# Patient Record
Sex: MALE | Race: WHITE | NOT HISPANIC OR LATINO | URBAN - METROPOLITAN AREA
[De-identification: names, ages, dates, MRNs, and addresses within clinical notes are randomized per-mention and may not be internally consistent; named-entity substitution may affect disease eponyms.]

---

## 2020-02-24 ENCOUNTER — EMERGENCY (EMERGENCY)
Facility: HOSPITAL | Age: 35
LOS: 0 days | Discharge: HOME | End: 2020-02-25
Admitting: EMERGENCY MEDICINE
Payer: COMMERCIAL

## 2020-02-24 VITALS
SYSTOLIC BLOOD PRESSURE: 119 MMHG | RESPIRATION RATE: 17 BRPM | HEART RATE: 87 BPM | OXYGEN SATURATION: 97 % | DIASTOLIC BLOOD PRESSURE: 68 MMHG | WEIGHT: 149.91 LBS | HEIGHT: 68 IN | TEMPERATURE: 98 F
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
## 2020-02-24 DIAGNOSIS — S99.922A UNSPECIFIED INJURY OF LEFT FOOT, INITIAL ENCOUNTER: ICD-10-CM

## 2020-02-24 DIAGNOSIS — Z23 ENCOUNTER FOR IMMUNIZATION: ICD-10-CM
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
## 2020-02-24 DIAGNOSIS — M79.676 PAIN IN UNSPECIFIED TOE(S): ICD-10-CM
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
## 2020-02-24 PROCEDURE — 99283 EMERGENCY DEPT VISIT LOW MDM: CPT

## 2020-02-24 NOTE — ED PROVIDER NOTE - PATIENT PORTAL LINK FT
You can access the FollowMyHealth Patient Portal offered by [facility] by registering at the following website: [email or web address]. By joining [facility]’s FollowMyHealth portal, you will also be able to view your health information using other applications (apps) compatible with our system.

## 2020-02-24 NOTE — ED ADULT TRIAGE NOTE - HEIGHT IN CM
172.72 Is This A New Presentation, Or A Follow-Up?: Skin Lesions How Severe Is Your Skin Lesion?: mild Have Your Skin Lesions Been Treated?: not been treated

## 2020-02-24 NOTE — ED PROVIDER NOTE - CLINICAL SUMMARY MEDICAL DECISION MAKING FREE TEXT BOX
34 year old healthy male here with an injury to his left 5th toe after dropping a can of tuna on it earlier tonight. Pt well appearing, ambulating w/o difficulty, vitals wnl. xrays negative for fx. dried blood cleaned, toe stone taped. patient to f/u with pmd, verbalizes understanding of return precautions.

## 2020-02-24 NOTE — ED PROVIDER NOTE - NS ED ROS FT
MSK: see HPI, (-) gait difficulty  SKIN: see HPI, (-) pallor, (-) ecchymosis  NEURO: (-) numbness, (-) weakness, (-) paresthesias    *all other systems negative except as documented above and in the HPI*

## 2020-02-24 NOTE — ED PROVIDER NOTE - NSFOLLOWUPINSTRUCTIONS_ED_ALL_ED_FT
Nail Avulsion    WHAT YOU NEED TO KNOW:    Nail avulsion is when part or all of a nail is torn away or removed from the nail bed. Avulsion may happen on your finger or toe. Common causes include ingrown nail, injury, or infection. The nail bed will form a hard layer and then a new nail may grow. The nail bed will be sensitive until the hard layer forms. You will need to keep it covered to prevent infection or more injury. You may need to care for your nail area for several months as the new nail grows.    DISCHARGE INSTRUCTIONS:    Return to the emergency department if:     Blood soaks through your bandage.      You have a red streak running up your leg or arm.    Contact your healthcare provider if:     You have a fever or chills.      Your injured area is red, swollen, or draining pus.       You have new or worsening pain, or pain that does not get better with medicine.      You have questions or concerns about your condition or care.    Medicines:     Antibiotics may help treat or prevent a bacterial infection.      Acetaminophen decreases pain and fever. It is available without a doctor's order. Ask how much to take and how often to take it. Follow directions. Acetaminophen can cause liver damage if not taken correctly.      NSAIDs, such as ibuprofen, help decrease swelling, pain, and fever. This medicine is available with or without a doctor's order. NSAIDs can cause stomach bleeding or kidney problems in certain people. If you take blood thinner medicine, always ask your healthcare provider if NSAIDs are safe for you. Always read the medicine label and follow directions.      Take your medicine as directed. Contact your healthcare provider if you think your medicine is not helping or if you have side effects. Tell him or her if you are allergic to any medicine. Keep a list of the medicines, vitamins, and herbs you take. Include the amounts, and when and why you take them. Bring the list or the pill bottles to follow-up visits. Carry your medicine list with you in case of an emergency.    Self-care:     Keep your nail area clean, dry, and covered. When you are allowed to bathe, carefully wash the area with soap and water. Put on a clean, new bandage. Do not use an adhesive bandage. It may stick to the wound and cause pain when you remove it. Ask your healthcare provider what kind of bandage to use. Change your bandage when it gets wet or dirty. Your healthcare provider may suggest that you change the bandage every 24 hours for the first few days.      Elevate your hand or foot above the level of your heart as often as you can for 24 hours. This will help decrease swelling and pain. Prop your hand or foot on pillows or blankets to keep it elevated comfortably.       Apply ice on your wound area for 15 to 20 minutes every hour or as directed. Use an ice pack, or put crushed ice in a plastic bag. Cover it with a towel. Ice helps prevent tissue damage and decreases swelling and pain.      Do not wear tight shoes or shoes that do not fit well. Do not wear tights or pantyhose. Wear cotton socks.      Ask when you can return to work, school, or your usual sports and activities.    Follow up with your healthcare provider as directed: You may be referred to a hand or foot specialist. Write down your questions so you remember to ask them during your visits.        © Copyright [employer] 2019 All illustrations and images included in CareNotes are the copyrighted property of [employer] or [employer].

## 2020-02-24 NOTE — ED PROVIDER NOTE - OBJECTIVE STATEMENT
34 year old male w no significant pmhx presents to the ED for evaluation of constant, mild, non-radiating pain to his left 5th toe x 1 hour. Pt was at home, wearing only socks when he accidentally dropped a can of tuna on his toe. States the nail appears to be close to falling off. Bleeding controlled prior to arrival. Pt otherwise states he is in his normal state of health.

## 2020-02-24 NOTE — ED PROVIDER NOTE - PHYSICAL EXAMINATION
VITALS:  I have reviewed the initial vital signs.  GENERAL: Well-developed, well-nourished, in no acute distress. Nontoxic.  CARDIO: 2+ pedal pulses bilaterally.  MSK: Normal, steady gait. FROM to b/l toes and ankles. No joint swelling, ttp, or deformity.   SKIN: Warm, dry. Left 5th toenail with scant amount of dried blood around cuticle. No subungual hematoma. No laceration or nail avulsion. Capillary refill <2 seconds.  NEURO: A&Ox3. Speech clear. 5/5 strength to lower extremities b/l. Sensation intact and equal throughout.

## 2020-02-25 PROCEDURE — 73630 X-RAY EXAM OF FOOT: CPT | Mod: 26,LT

## 2020-02-25 RX ORDER — TETANUS TOXOID, REDUCED DIPHTHERIA TOXOID AND ACELLULAR PERTUSSIS VACCINE, ADSORBED 5; 2.5; 8; 8; 2.5 [IU]/.5ML; [IU]/.5ML; UG/.5ML; UG/.5ML; UG/.5ML
0.5 SUSPENSION INTRAMUSCULAR ONCE
Refills: 0 | Status: COMPLETED | OUTPATIENT
Start: 2020-02-25 | End: 2020-02-25

## 2020-02-25 RX ADMIN — TETANUS TOXOID, REDUCED DIPHTHERIA TOXOID AND ACELLULAR PERTUSSIS VACCINE, ADSORBED 0.5 MILLILITER(S): 5; 2.5; 8; 8; 2.5 SUSPENSION INTRAMUSCULAR at 00:30

## 2020-02-25 NOTE — ED PROCEDURE NOTE - NS ED ATTENDING NAME FT
How Severe Are Your Spot(S)?: mild Have Your Spot(S) Been Treated In The Past?: has not been treated Hpi Title: Evaluation of Skin Lesions Year Removed: 1900 arley

## 2021-07-15 ENCOUNTER — RESULT REVIEW (OUTPATIENT)
Age: 36
End: 2021-07-15

## 2021-07-15 ENCOUNTER — TRANSCRIPTION ENCOUNTER (OUTPATIENT)
Age: 36
End: 2021-07-15

## 2021-07-15 ENCOUNTER — INPATIENT (INPATIENT)
Facility: HOSPITAL | Age: 36
LOS: 0 days | Discharge: HOME | End: 2021-07-16
Attending: SURGERY | Admitting: SURGERY
Payer: COMMERCIAL

## 2021-07-15 VITALS
OXYGEN SATURATION: 98 % | TEMPERATURE: 98 F | SYSTOLIC BLOOD PRESSURE: 116 MMHG | RESPIRATION RATE: 18 BRPM | WEIGHT: 160.06 LBS | DIASTOLIC BLOOD PRESSURE: 62 MMHG | HEIGHT: 68 IN | HEART RATE: 66 BPM

## 2021-07-15 LAB
ALBUMIN SERPL ELPH-MCNC: 4.7 G/DL — SIGNIFICANT CHANGE UP (ref 3.5–5.2)
ALP SERPL-CCNC: 60 U/L — SIGNIFICANT CHANGE UP (ref 30–115)
ALT FLD-CCNC: 13 U/L — SIGNIFICANT CHANGE UP (ref 0–41)
ANION GAP SERPL CALC-SCNC: 12 MMOL/L — SIGNIFICANT CHANGE UP (ref 7–14)
APPEARANCE UR: CLEAR — SIGNIFICANT CHANGE UP
AST SERPL-CCNC: 19 U/L — SIGNIFICANT CHANGE UP (ref 0–41)
BASOPHILS # BLD AUTO: 0 K/UL — SIGNIFICANT CHANGE UP (ref 0–0.2)
BASOPHILS NFR BLD AUTO: 0 % — SIGNIFICANT CHANGE UP (ref 0–1)
BILIRUB SERPL-MCNC: 0.8 MG/DL — SIGNIFICANT CHANGE UP (ref 0.2–1.2)
BILIRUB UR-MCNC: NEGATIVE — SIGNIFICANT CHANGE UP
BLD GP AB SCN SERPL QL: SIGNIFICANT CHANGE UP
BUN SERPL-MCNC: 12 MG/DL — SIGNIFICANT CHANGE UP (ref 10–20)
CALCIUM SERPL-MCNC: 9.4 MG/DL — SIGNIFICANT CHANGE UP (ref 8.5–10.1)
CHLORIDE SERPL-SCNC: 98 MMOL/L — SIGNIFICANT CHANGE UP (ref 98–110)
CO2 SERPL-SCNC: 24 MMOL/L — SIGNIFICANT CHANGE UP (ref 17–32)
COLOR SPEC: COLORLESS — SIGNIFICANT CHANGE UP
CREAT SERPL-MCNC: 0.8 MG/DL — SIGNIFICANT CHANGE UP (ref 0.7–1.5)
DIFF PNL FLD: SIGNIFICANT CHANGE UP
ELLIPTOCYTES BLD QL SMEAR: SLIGHT — SIGNIFICANT CHANGE UP
EOSINOPHIL # BLD AUTO: 0 K/UL — SIGNIFICANT CHANGE UP (ref 0–0.7)
EOSINOPHIL NFR BLD AUTO: 0 % — SIGNIFICANT CHANGE UP (ref 0–8)
GIANT PLATELETS BLD QL SMEAR: PRESENT — SIGNIFICANT CHANGE UP
GLUCOSE SERPL-MCNC: 102 MG/DL — HIGH (ref 70–99)
GLUCOSE UR QL: NEGATIVE — SIGNIFICANT CHANGE UP
HCT VFR BLD CALC: 43 % — SIGNIFICANT CHANGE UP (ref 42–52)
HGB BLD-MCNC: 14.7 G/DL — SIGNIFICANT CHANGE UP (ref 14–18)
KETONES UR-MCNC: ABNORMAL
LEUKOCYTE ESTERASE UR-ACNC: NEGATIVE — SIGNIFICANT CHANGE UP
LYMPHOCYTES # BLD AUTO: 0.44 K/UL — LOW (ref 1.2–3.4)
LYMPHOCYTES # BLD AUTO: 2.6 % — LOW (ref 20.5–51.1)
MANUAL SMEAR VERIFICATION: SIGNIFICANT CHANGE UP
MCHC RBC-ENTMCNC: 30.3 PG — SIGNIFICANT CHANGE UP (ref 27–31)
MCHC RBC-ENTMCNC: 34.2 G/DL — SIGNIFICANT CHANGE UP (ref 32–37)
MCV RBC AUTO: 88.7 FL — SIGNIFICANT CHANGE UP (ref 80–94)
MONOCYTES # BLD AUTO: 1.62 K/UL — HIGH (ref 0.1–0.6)
MONOCYTES NFR BLD AUTO: 9.5 % — HIGH (ref 1.7–9.3)
NEUTROPHILS # BLD AUTO: 14.81 K/UL — HIGH (ref 1.4–6.5)
NEUTROPHILS NFR BLD AUTO: 86.1 % — HIGH (ref 42.2–75.2)
NEUTS BAND # BLD: 0.9 % — SIGNIFICANT CHANGE UP (ref 0–6)
NITRITE UR-MCNC: NEGATIVE — SIGNIFICANT CHANGE UP
PH UR: 7.5 — SIGNIFICANT CHANGE UP (ref 5–8)
PLAT MORPH BLD: NORMAL — SIGNIFICANT CHANGE UP
PLATELET # BLD AUTO: 227 K/UL — SIGNIFICANT CHANGE UP (ref 130–400)
POTASSIUM SERPL-MCNC: 4.2 MMOL/L — SIGNIFICANT CHANGE UP (ref 3.5–5)
POTASSIUM SERPL-SCNC: 4.2 MMOL/L — SIGNIFICANT CHANGE UP (ref 3.5–5)
PROT SERPL-MCNC: 6.9 G/DL — SIGNIFICANT CHANGE UP (ref 6–8)
PROT UR-MCNC: NEGATIVE — SIGNIFICANT CHANGE UP
RBC # BLD: 4.85 M/UL — SIGNIFICANT CHANGE UP (ref 4.7–6.1)
RBC # FLD: 11.8 % — SIGNIFICANT CHANGE UP (ref 11.5–14.5)
RBC BLD AUTO: NORMAL — SIGNIFICANT CHANGE UP
SARS-COV-2 RNA SPEC QL NAA+PROBE: SIGNIFICANT CHANGE UP
SODIUM SERPL-SCNC: 134 MMOL/L — LOW (ref 135–146)
SP GR SPEC: 1.01 — LOW (ref 1.01–1.03)
STOMATOCYTES BLD QL SMEAR: SLIGHT — SIGNIFICANT CHANGE UP
UROBILINOGEN FLD QL: SIGNIFICANT CHANGE UP
VARIANT LYMPHS # BLD: 0.9 % — SIGNIFICANT CHANGE UP (ref 0–5)
WBC # BLD: 17.02 K/UL — HIGH (ref 4.8–10.8)
WBC # FLD AUTO: 17.02 K/UL — HIGH (ref 4.8–10.8)

## 2021-07-15 PROCEDURE — 74177 CT ABD & PELVIS W/CONTRAST: CPT | Mod: 26,MA

## 2021-07-15 PROCEDURE — 93010 ELECTROCARDIOGRAM REPORT: CPT

## 2021-07-15 PROCEDURE — 88112 CYTOPATH CELL ENHANCE TECH: CPT | Mod: 26

## 2021-07-15 PROCEDURE — 44970 LAPAROSCOPY APPENDECTOMY: CPT

## 2021-07-15 PROCEDURE — 71045 X-RAY EXAM CHEST 1 VIEW: CPT | Mod: 26

## 2021-07-15 PROCEDURE — 88304 TISSUE EXAM BY PATHOLOGIST: CPT | Mod: 26

## 2021-07-15 PROCEDURE — 88305 TISSUE EXAM BY PATHOLOGIST: CPT | Mod: 26

## 2021-07-15 PROCEDURE — 99284 EMERGENCY DEPT VISIT MOD MDM: CPT | Mod: 57

## 2021-07-15 PROCEDURE — 99285 EMERGENCY DEPT VISIT HI MDM: CPT

## 2021-07-15 RX ORDER — ONDANSETRON 8 MG/1
4 TABLET, FILM COATED ORAL ONCE
Refills: 0 | Status: DISCONTINUED | OUTPATIENT
Start: 2021-07-15 | End: 2021-07-16

## 2021-07-15 RX ORDER — BUPIVACAINE 13.3 MG/ML
20 INJECTION, SUSPENSION, LIPOSOMAL INFILTRATION ONCE
Refills: 0 | Status: DISCONTINUED | OUTPATIENT
Start: 2021-07-15 | End: 2021-07-15

## 2021-07-15 RX ORDER — HYDROMORPHONE HYDROCHLORIDE 2 MG/ML
0.5 INJECTION INTRAMUSCULAR; INTRAVENOUS; SUBCUTANEOUS
Refills: 0 | Status: DISCONTINUED | OUTPATIENT
Start: 2021-07-15 | End: 2021-07-16

## 2021-07-15 RX ORDER — MEPERIDINE HYDROCHLORIDE 50 MG/ML
12.5 INJECTION INTRAMUSCULAR; INTRAVENOUS; SUBCUTANEOUS
Refills: 0 | Status: DISCONTINUED | OUTPATIENT
Start: 2021-07-15 | End: 2021-07-16

## 2021-07-15 RX ORDER — ACETAMINOPHEN 500 MG
650 TABLET ORAL EVERY 6 HOURS
Refills: 0 | Status: DISCONTINUED | OUTPATIENT
Start: 2021-07-15 | End: 2021-07-15

## 2021-07-15 RX ORDER — MORPHINE SULFATE 50 MG/1
2 CAPSULE, EXTENDED RELEASE ORAL EVERY 6 HOURS
Refills: 0 | Status: DISCONTINUED | OUTPATIENT
Start: 2021-07-15 | End: 2021-07-15

## 2021-07-15 RX ORDER — PANTOPRAZOLE SODIUM 20 MG/1
40 TABLET, DELAYED RELEASE ORAL
Refills: 0 | Status: DISCONTINUED | OUTPATIENT
Start: 2021-07-15 | End: 2021-07-15

## 2021-07-15 RX ORDER — KETOROLAC TROMETHAMINE 30 MG/ML
15 SYRINGE (ML) INJECTION EVERY 6 HOURS
Refills: 0 | Status: DISCONTINUED | OUTPATIENT
Start: 2021-07-15 | End: 2021-07-15

## 2021-07-15 RX ORDER — IBUPROFEN 200 MG
1 TABLET ORAL
Qty: 28 | Refills: 0
Start: 2021-07-15 | End: 2021-07-21

## 2021-07-15 RX ORDER — AMPICILLIN SODIUM AND SULBACTAM SODIUM 250; 125 MG/ML; MG/ML
3 INJECTION, POWDER, FOR SUSPENSION INTRAMUSCULAR; INTRAVENOUS ONCE
Refills: 0 | Status: COMPLETED | OUTPATIENT
Start: 2021-07-15 | End: 2021-07-15

## 2021-07-15 RX ORDER — OXYCODONE HYDROCHLORIDE 5 MG/1
5 TABLET ORAL EVERY 6 HOURS
Refills: 0 | Status: DISCONTINUED | OUTPATIENT
Start: 2021-07-15 | End: 2021-07-16

## 2021-07-15 RX ORDER — HYDROMORPHONE HYDROCHLORIDE 2 MG/ML
1 INJECTION INTRAMUSCULAR; INTRAVENOUS; SUBCUTANEOUS
Refills: 0 | Status: DISCONTINUED | OUTPATIENT
Start: 2021-07-15 | End: 2021-07-16

## 2021-07-15 RX ORDER — ENOXAPARIN SODIUM 100 MG/ML
40 INJECTION SUBCUTANEOUS DAILY
Refills: 0 | Status: DISCONTINUED | OUTPATIENT
Start: 2021-07-15 | End: 2021-07-15

## 2021-07-15 RX ORDER — OXYCODONE HYDROCHLORIDE 5 MG/1
1 TABLET ORAL
Qty: 5 | Refills: 0
Start: 2021-07-15

## 2021-07-15 RX ORDER — ACETAMINOPHEN 500 MG
2 TABLET ORAL
Qty: 56 | Refills: 0
Start: 2021-07-15 | End: 2021-07-21

## 2021-07-15 RX ORDER — HYDROMORPHONE HYDROCHLORIDE 2 MG/ML
2 INJECTION INTRAMUSCULAR; INTRAVENOUS; SUBCUTANEOUS EVERY 6 HOURS
Refills: 0 | Status: DISCONTINUED | OUTPATIENT
Start: 2021-07-15 | End: 2021-07-15

## 2021-07-15 RX ORDER — MORPHINE SULFATE 50 MG/1
4 CAPSULE, EXTENDED RELEASE ORAL ONCE
Refills: 0 | Status: DISCONTINUED | OUTPATIENT
Start: 2021-07-15 | End: 2021-07-15

## 2021-07-15 RX ORDER — AMPICILLIN SODIUM AND SULBACTAM SODIUM 250; 125 MG/ML; MG/ML
INJECTION, POWDER, FOR SUSPENSION INTRAMUSCULAR; INTRAVENOUS
Refills: 0 | Status: DISCONTINUED | OUTPATIENT
Start: 2021-07-15 | End: 2021-07-15

## 2021-07-15 RX ORDER — SODIUM CHLORIDE 9 MG/ML
1000 INJECTION, SOLUTION INTRAVENOUS
Refills: 0 | Status: DISCONTINUED | OUTPATIENT
Start: 2021-07-15 | End: 2021-07-15

## 2021-07-15 RX ORDER — PANTOPRAZOLE SODIUM 20 MG/1
40 TABLET, DELAYED RELEASE ORAL
Refills: 0 | Status: DISCONTINUED | OUTPATIENT
Start: 2021-07-15 | End: 2021-07-16

## 2021-07-15 RX ORDER — ACETAMINOPHEN 500 MG
650 TABLET ORAL ONCE
Refills: 0 | Status: DISCONTINUED | OUTPATIENT
Start: 2021-07-15 | End: 2021-07-16

## 2021-07-15 RX ORDER — CEFOTETAN DISODIUM 1 G
1 VIAL (EA) INJECTION ONCE
Refills: 0 | Status: DISCONTINUED | OUTPATIENT
Start: 2021-07-15 | End: 2021-07-15

## 2021-07-15 RX ORDER — IBUPROFEN 200 MG
400 TABLET ORAL EVERY 6 HOURS
Refills: 0 | Status: DISCONTINUED | OUTPATIENT
Start: 2021-07-15 | End: 2021-07-15

## 2021-07-15 RX ORDER — SODIUM CHLORIDE 9 MG/ML
1000 INJECTION, SOLUTION INTRAVENOUS
Refills: 0 | Status: DISCONTINUED | OUTPATIENT
Start: 2021-07-15 | End: 2021-07-16

## 2021-07-15 RX ORDER — IOHEXOL 300 MG/ML
30 INJECTION, SOLUTION INTRAVENOUS ONCE
Refills: 0 | Status: COMPLETED | OUTPATIENT
Start: 2021-07-15 | End: 2021-07-15

## 2021-07-15 RX ORDER — AMPICILLIN SODIUM AND SULBACTAM SODIUM 250; 125 MG/ML; MG/ML
3 INJECTION, POWDER, FOR SUSPENSION INTRAMUSCULAR; INTRAVENOUS EVERY 6 HOURS
Refills: 0 | Status: CANCELLED | OUTPATIENT
Start: 2021-07-16 | End: 2021-07-15

## 2021-07-15 RX ORDER — IBUPROFEN 200 MG
400 TABLET ORAL EVERY 6 HOURS
Refills: 0 | Status: DISCONTINUED | OUTPATIENT
Start: 2021-07-15 | End: 2021-07-16

## 2021-07-15 RX ORDER — ENOXAPARIN SODIUM 100 MG/ML
40 INJECTION SUBCUTANEOUS DAILY
Refills: 0 | Status: DISCONTINUED | OUTPATIENT
Start: 2021-07-15 | End: 2021-07-16

## 2021-07-15 RX ADMIN — SODIUM CHLORIDE 1000 MILLILITER(S): 9 INJECTION, SOLUTION INTRAVENOUS at 19:28

## 2021-07-15 RX ADMIN — SODIUM CHLORIDE 125 MILLILITER(S): 9 INJECTION, SOLUTION INTRAVENOUS at 14:54

## 2021-07-15 RX ADMIN — MORPHINE SULFATE 4 MILLIGRAM(S): 50 CAPSULE, EXTENDED RELEASE ORAL at 14:54

## 2021-07-15 RX ADMIN — AMPICILLIN SODIUM AND SULBACTAM SODIUM 200 GRAM(S): 250; 125 INJECTION, POWDER, FOR SUSPENSION INTRAMUSCULAR; INTRAVENOUS at 21:35

## 2021-07-15 RX ADMIN — MORPHINE SULFATE 4 MILLIGRAM(S): 50 CAPSULE, EXTENDED RELEASE ORAL at 15:11

## 2021-07-15 RX ADMIN — IOHEXOL 30 MILLILITER(S): 300 INJECTION, SOLUTION INTRAVENOUS at 14:54

## 2021-07-15 RX ADMIN — SODIUM CHLORIDE 125 MILLILITER(S): 9 INJECTION, SOLUTION INTRAVENOUS at 19:28

## 2021-07-15 NOTE — H&P ADULT - NSHPPHYSICALEXAM_GEN_ALL_CORE
PHYSICAL EXAM:  GENERAL: No acute distress, well-developed  HEAD:  Atraumatic, Normocephalic  EYES: EOMI, PERRLA, conjunctiva and sclera clear  NECK: Supple, no lymphadenopathy, no JVD  CHEST/LUNG: CTA X 2; No wheezes, rales, or rhonchi  HEART: Regular rate and rhythm. Normal S1/S2. No murmurs, rubs, or gallops  ABDOMEN: Soft, RLQ tenderness to palpation  EXTREMITIES:  2+ peripheral pulses b/l, No clubbing, cyanosis, or edema  NEUROLOGY: AAOx3, no focal deficits  SKIN: No rashes or lesions

## 2021-07-15 NOTE — ED PROVIDER NOTE - NS ED ROS FT
Constitutional: (-) fever, (-) weight loss  Eyes/ENT: (-) blurry vision, (-) epistaxis, (-) sore throat  Cardiovascular: (-) chest pain, (-) syncope  Respiratory: (-) cough, (-) shortness of breath  Gastrointestinal: (+)vomiting x2  (-) diarrhea, LBM this AM, (+) abdominal pain  :(-) groin pain, (-) dysuria /hematuria/ urgency/ frequency  Musculoskeletal: (-) neck pain, (-) back pain, (-) joint pain  Integumentary: (-) rash, (-) edema  Neurological: (-) headache, (-) altered mental status, (-) focal weakness or paresthesias  Allergic/Immunologic: (-) pruritus

## 2021-07-15 NOTE — CHART NOTE - NSCHARTNOTEFT_GEN_A_CORE
PACU ANESTHESIA ADMISSION NOTE      Procedure: Laparoscopic appendectomy in adult      Post op diagnosis:      ____  Intubated  TV:______       Rate: ______      FiO2: ______    __x__  Patent Airway    _x___  Full return of protective reflexes    ___x_  Full recovery from anesthesia / back to baseline status    Vitals:  T(F): 98.8 (07-15-21 @ 23:30), Max: 37.3 (07-15-21 @ 22:09)  HR: 92 (07-15-21 @ 23:30) (66 - 92)  BP: 132/75 (07-15-21 @ 23:30) (116/62 - 118/72)  RR: 16(07-15-21 @ 23:30) (18 - 20)  SpO2: 98% (07-15-21 @ 23:30) (98% - 99%)    Mental Status:  __x__ Awake   ____x_ Alert   _____ Drowsy   _____ Sedated    Nausea/Vomiting:  __x__ NO  ______Yes,   See Post - Op Orders          Pain Scale (0-10):  ___5__    Treatment: ____ None    ___x_ See Post - Op/PCA Orders    Post - Operative Fluids:   ____ Oral   __x__ See Post - Op Orders    Plan: Discharge:   __x__Home       _____Floor     _____Critical Care    _____  Other:_________________    Comments: Transferred care to PACU RN; discharge to home when criteria met.

## 2021-07-15 NOTE — ED PROVIDER NOTE - PROGRESS NOTE DETAILS
CT consistent with acute appendicitis, cefotetan ordered, surgery aware ( Dr Caceres), patient aware of diagnosis, declined additional analgesia.  Admit to surgery.

## 2021-07-15 NOTE — H&P ADULT - NSHPLABSRESULTS_GEN_ALL_CORE
< from: CT Abdomen and Pelvis w/ Oral Cont and w/ IV Cont (07.15.21 @ 17:25) >      IMPRESSION:    Findings consistent with acute appendicitis. No pneumoperitoneum or ascites to suggest perforation.      Dr. Bertrand Yen discussed preliminary findings with BETHANY TEE MD on 7/15/2021 6:50 PM with readback.    --- End of Report ---      < end of copied text >    Urinalysis (07.15.21 @ 16:40)   Glucose Qualitative, Urine: Negative   Blood, Urine: Trace   pH Urine: 7.5   Color: Colorless   Urine Appearance: Clear   Bilirubin: Negative   Ketone - Urine: Moderate   Specific Gravity: 1.006   Protein, Urine: Negative   Urobilinogen: <2 mg/dL   Nitrite: Negative   Leukocyte Esterase Concentration: Negative     Antibody Screen Interpretation (07.15.21 @ 15:07)   Antibody Screen: NEG     Type + Screen (07.15.21 @ 15:07)   ABO RH Interpretation: B POS     Complete Blood Count + Automated Diff (07.15.21 @ 14:40)   WBC Count: 17.02 K/uL   RBC Count: 4.85 M/uL   Hemoglobin: 14.7 g/dL   Hematocrit: 43.0 %   Mean Cell Volume: 88.7 fL   Mean Cell Hemoglobin: 30.3 pg   Mean Cell Hemoglobin Conc: 34.2 g/dL   Red Cell Distrib Width: 11.8 %   Platelet Count - Automated: 227 K/uL   Auto Neutrophil #: 14.81 K/uL   Auto Lymphocyte #: 0.44 K/uL   Auto Monocyte #: 1.62 K/uL   Auto Eosinophil #: 0.00 K/uL   Auto Basophil #: 0.00 K/uL   Auto Neutrophil %: 86.1: Differential percentages must be correlated with absolute numbers for   clinical significance. %   Auto Lymphocyte %: 2.6 %   Auto Monocyte %: 9.5 %   Auto Eosinophil %: 0.0 %   Auto Basophil %: 0.0 %   Manual Differential (07.15.21 @ 14:40)   Stomatocytes: Slight   Elliptocytes: Slight   Red Cell Morphology: Normal   Platelet Morphology: Normal   Reactive Lymphocytes %: 0.9 %   Giant Platelets: Present   Manual Smear Verification: Performed   Band Neutrophils %: 0.9 %     Comprehensive Metabolic Panel (07.15.21 @ 14:40)   Sodium, Serum: 134 mmol/L   Potassium, Serum: 4.2 mmol/L   Chloride, Serum: 98 mmol/L   Carbon Dioxide, Serum: 24 mmol/L   Anion Gap, Serum: 12 mmol/L   Blood Urea Nitrogen, Serum: 12 mg/dL   Creatinine, Serum: 0.8 mg/dL   Glucose, Serum: 102 mg/dL   Calcium, Total Serum: 9.4 mg/dL   Protein Total, Serum: 6.9 g/dL   Albumin, Serum: 4.7 g/dL   Bilirubin Total, Serum: 0.8 mg/dL   Alkaline Phosphatase, Serum: 60 U/L   Aspartate Aminotransferase (AST/SGOT): 19 U/L   Alanine Aminotransferase (ALT/SGPT): 13 U/L   eGFR if Non : 117: Interpretative comment   The units for eGFR are mL/min/1.73M2 (normalized body surface area). The

## 2021-07-15 NOTE — H&P ADULT - ASSESSMENT
Patient evaluated at beside with RUL tenderness to palpation, WBC elevation of 17 and a impression on CT of acute appendicitis.     PLAN  -Admit patient to the surgical service  -Patient to OR for laparoscopic appendectomy with possible open appendectomy  -Keep NPO  -IVF's  -Pain management  -DVT prophylaxis  -GI prophylaxis  -Follow I&O's  -Pre OP labs   Patient evaluated at beside with RLQ tenderness to palpation, WBC elevation of 17 and a impression on CT of acute appendicitis.     PLAN  -Admit patient to the surgical service  -Patient to OR for laparoscopic appendectomy with possible open appendectomy  -Keep NPO  -IVF's  -Pain management  -DVT prophylaxis  -GI prophylaxis  -Follow I&O's  -Pre OP labs    Senior Resident Addendum  Narrative as above, with 1d umbilical to RLQ pain associated with N/V, with clinical signs, symptoms, imaging, and exam findings concerning for acute appendicitis. Added on for OR for lap appy, poss open.   Case d/w Dr. Ta, patient and em, ED, surgical team.

## 2021-07-15 NOTE — BRIEF OPERATIVE NOTE - NSICDXBRIEFPOSTOP_GEN_ALL_CORE_FT
POST-OP DIAGNOSIS:  Acute appendicitis with localized peritonitis, without perforation, abscess, or gangrene 15-Jul-2021 23:43:19  Delgado Ortega T

## 2021-07-15 NOTE — BRIEF OPERATIVE NOTE - NSICDXBRIEFPREOP_GEN_ALL_CORE_FT
PRE-OP DIAGNOSIS:  Acute appendicitis with localized peritonitis, without perforation, abscess, or gangrene 15-Jul-2021 23:43:05  Delgado Ortega T

## 2021-07-15 NOTE — ED PROVIDER NOTE - OBJECTIVE STATEMENT
36 yo male without any significant PMH c/o rt sided lower abdominal pain since 11 PM last night.  He woke up at 4 AM with severe pain, non-radiating, associated with 2 episodes of NBNB emesis.  Denies any groin or back pain, no urinary complaints, no fever or chills.  Will treat pain, labs, NPO, CT scan r/o acute appy.

## 2021-07-15 NOTE — ED PROVIDER NOTE - PHYSICAL EXAMINATION
Vital Signs: I have reviewed the initial vital signs.  Constitutional: well-nourished, appears stated age, no acute distress  HEENT: PERRL, mmm, nml phonation  Cardiovascular: regular rate, regular rhythm, well-perfused extremities, distal pulses intact  Respiratory: unlabored respiratory effort, clear to auscultation bilaterally, speaking full sentences  Gastrointestinal: soft, non-distended abdomen, no pulsatile mass, + RLQ ttp , no CVA ttp  Musculoskeletal: supple neck, no lower extremity edema, no midline spine ttp  Integumentary: warm, dry, no rash  Neurologic: awake, alert, cranial nerves II-XII grossly intact, extremities’ motor and sensory functions grossly intact  Psychiatric: appropriate mood, appropriate affect

## 2021-07-15 NOTE — ED ADULT TRIAGE NOTE - CHIEF COMPLAINT QUOTE
Pt c/o generalized weakness, tender abdomen and nausea that started at 1 AM today, denies fever/chills/SOB

## 2021-07-15 NOTE — ED ADULT NURSE NOTE - OBJECTIVE STATEMENT
Pt presented to ED c/o abd pain. Pt c/o R sided abd pain and n/v x1 day. Denies nd/fevers/chills. Pt A&Ox4, ambulatory.

## 2021-07-15 NOTE — BRIEF OPERATIVE NOTE - OPERATION/FINDINGS
Inflamed, dilated appendix  Murky fluid in pelvis  appendix endolooped (PDS) x3,divided with endosheers  Harmonic scalpel used to divide mesoappendix

## 2021-07-15 NOTE — H&P ADULT - HISTORY OF PRESENT ILLNESS
Case of 34 y/o male pt with no PMH, that comes to the ED referring abdominal pain. Pt refers the pain began today at 4AM, localized in the epigastric area of the abdomen and slowly progressed to the RLQ through the day. The pain is constant with an intensity of 10/10. Pt refers he self medicated with milk of magnesia and Pepto-Bismol, which did not improve his symptoms. Pt also refers two vomiting episodes after self medication and four bowel movements without any secretion or blood seen. Pt denies any previous similar episodes.

## 2021-07-16 VITALS
HEART RATE: 64 BPM | OXYGEN SATURATION: 97 % | DIASTOLIC BLOOD PRESSURE: 63 MMHG | RESPIRATION RATE: 12 BRPM | SYSTOLIC BLOOD PRESSURE: 111 MMHG

## 2021-07-16 PROBLEM — Z00.00 ENCOUNTER FOR PREVENTIVE HEALTH EXAMINATION: Status: ACTIVE | Noted: 2021-07-16

## 2021-07-16 LAB
COVID-19 SPIKE DOMAIN AB INTERP: POSITIVE
COVID-19 SPIKE DOMAIN ANTIBODY RESULT: 40.4 U/ML — HIGH
SARS-COV-2 IGG+IGM SERPL QL IA: 40.4 U/ML — HIGH
SARS-COV-2 IGG+IGM SERPL QL IA: POSITIVE

## 2021-07-16 RX ORDER — IBUPROFEN 200 MG
1 TABLET ORAL
Qty: 28 | Refills: 0
Start: 2021-07-16 | End: 2021-07-22

## 2021-07-16 RX ORDER — ACETAMINOPHEN 500 MG
2 TABLET ORAL
Qty: 56 | Refills: 0
Start: 2021-07-16 | End: 2021-07-22

## 2021-07-16 RX ORDER — OXYCODONE HYDROCHLORIDE 5 MG/1
1 TABLET ORAL
Qty: 5 | Refills: 0
Start: 2021-07-16

## 2021-07-16 RX ADMIN — SODIUM CHLORIDE 100 MILLILITER(S): 9 INJECTION, SOLUTION INTRAVENOUS at 00:00

## 2021-07-16 NOTE — DISCHARGE NOTE PROVIDER - CARE PROVIDER_API CALL
Charles Ta)  Surgery  [address] [place] [address], [address] [place] [address]  Phone: [phone]  Fax: [phone]  Established Patient  Follow Up Time: 1 week

## 2021-07-16 NOTE — DISCHARGE NOTE PROVIDER - NSDCCPCAREPLAN_GEN_ALL_CORE_FT
PRINCIPAL DISCHARGE DIAGNOSIS  Diagnosis: Acute appendicitis  Assessment and Plan of Treatment: S/P laparoscopic appendectomy  Stable Post-op  Pain control: Tylenol and Motrin around the clock, patient received a TAP block in the OR, and will be prescribed 5 tablets of Oxycodone to take only as needed for severe pain  Avoid heavy lifting or strenuous activity for 3-4 weeks  Follow up with surgeon in the office in 1 week

## 2021-07-16 NOTE — ASU DISCHARGE PLAN (ADULT/PEDIATRIC) - CARE PROVIDER_API CALL
Charles Ta)  Surgery  [address] [place] [address], [address] [place] [address]  Phone: [phone]  Fax: [phone]  Follow Up Time:

## 2021-07-16 NOTE — DISCHARGE NOTE PROVIDER - NSDCFUADDAPPT_GEN_ALL_CORE_FT
Patient will need routine follow up with his PMD and GI doctor if persistent symptoms following recovery

## 2021-07-16 NOTE — DISCHARGE NOTE NURSING/CASE MANAGEMENT/SOCIAL WORK - NSDCVIVACCINE_GEN_ALL_CORE_FT
Tdap; 25-Feb-2020 00:30; Cecilia Mejia (RN); [employer]; [id number] (Exp. Date: 19-Mar-2022); IntraMuscular; Deltoid Right.; 0.5 milliLiter(s); VIS (VIS Published: 09-May-2013, VIS Presented: 25-Feb-2020);

## 2021-07-16 NOTE — DISCHARGE NOTE NURSING/CASE MANAGEMENT/SOCIAL WORK - PATIENT PORTAL LINK FT
You can access the FollowMyHealth Patient Portal offered by [facility] by registering at the following website: [email or web address]. By joining [employer]’s FollowMyHealth portal, you will also be able to view your health information using other applications (apps) compatible with our system.

## 2021-07-16 NOTE — DISCHARGE NOTE PROVIDER - HOSPITAL COURSE
This is a 36 y/o male pt with no significant PMH, who presents to the ED with 1 day fo worsening abdominal pain for about 24hrs. He had associated N/V and diarrhea. On further history, patient had similar pain in 2014 for which he was worked up with a CT abdomen, demonstrating thickened terminal ileum. He was referred to his GI physician for a colonoscopy, form which biopsies were negative for Crohn's disease. He denies chronic diarrhea, bloody BM, or weight loss that was unintentional.     In the ED, he had a WBC of 17 and a CT A/P that demonstrated a dilated, inflamed appendix consistent with acute appendicitis. He was consented and taken to the OR for a laparoscopic appendectomy. He had an uncomplicated procedure and is cleared from PACU for DC home as he has tolerated diet, ambulated, and his pain is controlled.

## 2021-07-17 RX ORDER — IBUPROFEN 600 MG/1
600 TABLET, FILM COATED ORAL
Qty: 28 | Refills: 1 | Status: ACTIVE | COMMUNITY
Start: 2021-07-17 | End: 1900-01-01

## 2021-07-17 RX ORDER — OXYCODONE 5 MG/1
5 TABLET ORAL
Qty: 5 | Refills: 0 | Status: ACTIVE | COMMUNITY
Start: 2021-07-17 | End: 1900-01-01

## 2021-07-19 LAB — NON-GYNECOLOGICAL CYTOLOGY STUDY: SIGNIFICANT CHANGE UP

## 2021-07-22 LAB — SURGICAL PATHOLOGY STUDY: SIGNIFICANT CHANGE UP

## 2021-07-23 ENCOUNTER — APPOINTMENT (OUTPATIENT)
Dept: SURGERY | Facility: CLINIC | Age: 36
End: 2021-07-23
Payer: COMMERCIAL

## 2021-07-23 VITALS
BODY MASS INDEX: 24.17 KG/M2 | OXYGEN SATURATION: 98 % | DIASTOLIC BLOOD PRESSURE: 70 MMHG | WEIGHT: 154 LBS | SYSTOLIC BLOOD PRESSURE: 122 MMHG | TEMPERATURE: 97.9 F | HEART RATE: 93 BPM | HEIGHT: 67 IN

## 2021-07-23 DIAGNOSIS — K35.80 UNSPECIFIED ACUTE APPENDICITIS: ICD-10-CM

## 2021-07-23 DIAGNOSIS — K35.30 ACUTE APPENDICITIS WITH LOCALIZED PERITONITIS, WITHOUT PERFORATION OR GANGRENE: ICD-10-CM

## 2021-07-23 PROCEDURE — 99024 POSTOP FOLLOW-UP VISIT: CPT

## 2021-07-23 NOTE — ASSESSMENT
[FreeTextEntry1] : Stu - 35 year old man s/p appendectomy for perforated appendicits. doing well \par denies fever , diarrhea \par minimal incisional pain. \par \par impression : doing well\par \par We explained in great detail the pathophysiology of the disease process. We chirag diagrams and discussed the workup for diagnosis and management.\par The various options were explained to the patient. The Risk , benefit and alternatives were discussed. We discussed recovery and possible complications.\par The Post operative care was explained to the patient. He was counselled on diet , exercise and wound care.\par We discussed the pathology and surgery with him.\par \par We discussed the importance of close follow up. \par We informed that he needs to follow up as needed  .\par We also informed that he can call us if anything changes or has any questions.\par

## 2021-07-23 NOTE — HISTORY OF PRESENT ILLNESS
[de-identified] : Stu - 35 year old man s/p appendectomy for perforated appendicits. doing well \par denies fever , diarrhea \par minimal incisional pain.  other

## 2021-08-18 ENCOUNTER — APPOINTMENT (OUTPATIENT)
Dept: SURGERY | Facility: CLINIC | Age: 36
End: 2021-08-18

## 2021-08-25 ENCOUNTER — APPOINTMENT (OUTPATIENT)
Dept: SURGERY | Facility: CLINIC | Age: 36
End: 2021-08-25
Payer: COMMERCIAL

## 2021-08-25 VITALS
DIASTOLIC BLOOD PRESSURE: 72 MMHG | BODY MASS INDEX: 24.33 KG/M2 | HEIGHT: 67 IN | TEMPERATURE: 97.2 F | HEART RATE: 84 BPM | WEIGHT: 155 LBS | SYSTOLIC BLOOD PRESSURE: 105 MMHG

## 2021-08-25 DIAGNOSIS — R10.9 UNSPECIFIED ABDOMINAL PAIN: ICD-10-CM

## 2021-08-25 DIAGNOSIS — K35.30 ACUTE APPENDICITIS W/ LOCALIZED PERITONITIS, W/O PERFORATION OR GANGRENE: ICD-10-CM

## 2021-08-25 PROCEDURE — 99024 POSTOP FOLLOW-UP VISIT: CPT

## 2021-08-25 NOTE — ASSESSMENT
[FreeTextEntry1] : Stu - 35 year old man s/p appendectomy for perforated appendicits. doing well \par denies fever , diarrhea \par minimal incisional pain. \par 8/25: He is having left lower quadrant pain  and suprapubic pain. \par Denies fever , \par Pain is related to movement and not to food or bowel / bladder habits'\par no cva tenderss \par no dysurea. \par \par impression : doing well\par \par vague pain ? cause\par \par ? msk \par \par will get cbc / bmp/ ua\par will advise some nsaids\par \par We explained in great detail the pathophysiology of the disease process. We chirag diagrams and discussed the workup for diagnosis and management.\par The various options were explained to the patient. The Risk , benefit and alternatives were discussed. We discussed recovery and possible complications.\par The Post operative care was explained to the patient. He was counselled on diet , exercise and wound care.\par We discussed the pathology and surgery with him.\par \par We discussed the importance of close follow up. \par We informed that he needs to follow up as 2 weeks. .\par We also informed that he can call us if anything changes or has any questions.\par

## 2021-08-25 NOTE — HISTORY OF PRESENT ILLNESS
[de-identified] : Stu - 35 year old man s/p appendectomy for perforated appendicits. doing well \par denies fever , diarrhea \par minimal incisional pain. \par 8/25: He is having left lower quadrant pain  and suprapubic pain. \par Denies fever , \par Pain is related to movement and not to food or bowel / bladder habits'\par no cva tenderss \par no dysurea.

## 2021-09-12 ENCOUNTER — TRANSCRIPTION ENCOUNTER (OUTPATIENT)
Age: 36
End: 2021-09-12

## 2021-10-27 LAB
ANION GAP SERPL CALC-SCNC: 11 MMOL/L
BASOPHILS # BLD AUTO: 0.04 K/UL
BASOPHILS NFR BLD AUTO: 0.9 %
BUN SERPL-MCNC: 14 MG/DL
CALCIUM SERPL-MCNC: 9.4 MG/DL
CHLORIDE SERPL-SCNC: 103 MMOL/L
CO2 SERPL-SCNC: 27 MMOL/L
CREAT SERPL-MCNC: 0.9 MG/DL
EOSINOPHIL # BLD AUTO: 0.17 K/UL
EOSINOPHIL NFR BLD AUTO: 3.7 %
GLUCOSE SERPL-MCNC: 80 MG/DL
HCT VFR BLD CALC: 44.5 %
HGB BLD-MCNC: 14.6 G/DL
IMM GRANULOCYTES NFR BLD AUTO: 0.2 %
LYMPHOCYTES # BLD AUTO: 1.02 K/UL
LYMPHOCYTES NFR BLD AUTO: 22.1 %
MAN DIFF?: NORMAL
MCHC RBC-ENTMCNC: 29.6 PG
MCHC RBC-ENTMCNC: 32.8 G/DL
MCV RBC AUTO: 90.3 FL
MONOCYTES # BLD AUTO: 0.5 K/UL
MONOCYTES NFR BLD AUTO: 10.8 %
NEUTROPHILS # BLD AUTO: 2.88 K/UL
NEUTROPHILS NFR BLD AUTO: 62.3 %
PLATELET # BLD AUTO: 232 K/UL
POTASSIUM SERPL-SCNC: 4.5 MMOL/L
RBC # BLD: 4.93 M/UL
RBC # FLD: 12.2 %
SODIUM SERPL-SCNC: 141 MMOL/L
WBC # FLD AUTO: 4.62 K/UL

## 2022-04-25 ENCOUNTER — TRANSCRIPTION ENCOUNTER (OUTPATIENT)
Age: 37
End: 2022-04-25

## 2025-02-26 ENCOUNTER — NON-APPOINTMENT (OUTPATIENT)
Age: 40
End: 2025-02-26